# Patient Record
Sex: MALE | Race: BLACK OR AFRICAN AMERICAN
[De-identification: names, ages, dates, MRNs, and addresses within clinical notes are randomized per-mention and may not be internally consistent; named-entity substitution may affect disease eponyms.]

---

## 2019-10-08 ENCOUNTER — HOSPITAL ENCOUNTER (EMERGENCY)
Dept: HOSPITAL 26 - MED | Age: 54
Discharge: HOME | End: 2019-10-08
Payer: COMMERCIAL

## 2019-10-08 VITALS — DIASTOLIC BLOOD PRESSURE: 75 MMHG | SYSTOLIC BLOOD PRESSURE: 135 MMHG

## 2019-10-08 VITALS — BODY MASS INDEX: 27.35 KG/M2 | WEIGHT: 191 LBS | HEIGHT: 70 IN
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
## 2019-10-08 DIAGNOSIS — S80.12XA: Primary | ICD-10-CM
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
## 2019-10-08 PROCEDURE — 99283 EMERGENCY DEPT VISIT LOW MDM: CPT

## 2019-10-08 PROCEDURE — 73590 X-RAY EXAM OF LOWER LEG: CPT

## 2019-10-08 NOTE — NUR
C/O L LOWER LEG PAIN STARTING YESTERDAY S/P HITTING CALF WITH A TABLE LEG WHILE 
MOVING IT. +CMS. ICE PACK APPLIED TO SITE. SWELLING NOTED. NO DISCOLORATION. 
DENIES ANKLE PAIN. VSS. [id number]. BED IS DOWN, LOCKED, BED RAIL X 1, ERMD TO SEE 
PT.



HX: NONE

RX: NONE